# Patient Record
Sex: FEMALE
[De-identification: names, ages, dates, MRNs, and addresses within clinical notes are randomized per-mention and may not be internally consistent; named-entity substitution may affect disease eponyms.]

---

## 2020-05-01 ENCOUNTER — APPOINTMENT (OUTPATIENT)
Dept: CARDIOTHORACIC SURGERY | Facility: CLINIC | Age: 75
End: 2020-05-01

## 2021-08-23 NOTE — CARDIOLOGY SUMMARY
[de-identified] : 4/13/2020 DUAL ISOTOPE MYOCARDIAL PERFUSION SCAN Normal pharmacologic Thallium-Myoview dual isotope scan without evidence of any significant fixed or reversible perfusion abnormality. No evidence of any significant ischemic disease. Correlation with the concurrent stress ECG and the patient's clinical history is suggested for further evaluation.  [de-identified] : 7/5/2013  Normal mitral valve. Trace mitral regurgitation. Normal trileaflet aortic valve. Minimal aortic regurgitation. Normal left ventricular systolic function. No segmental wall motion abnormalities. Mild diastolic dysfunction (Stage I). Inadequate tricuspid regurgitation Doppler envelope precludes estimation of RVSP. Normal tricuspid valve. Trace tricuspid regurgitation. \par

## 2021-08-23 NOTE — HISTORY OF PRESENT ILLNESS
[FreeTextEntry1] : NATHALIE PIERRE is a 76 year old female here as a self-referral (previous patient) on 09/10/2021, 6 years after she was last seen. To review, she has a PMH of HTN, HLD, hypothyroidism, GERD, and anxiety. At her last office visit 6 years ago, her BP was borderline elevated though anxious and no medication changes were made to her regime. She was to continue to monitor her BP at home. \par \par Since she was last here, \par \par She comes to the office today reporting feeling _____ overall.\par She currently denies fever, chills, cough, loss of smell or taste, palpitations, angina, dyspnea, dizziness, lightheadedness, syncope, or peripheral edema. Her energy level is and her  appetite is good. Denies bowel or bladder problems. She does/does not exercise. She has/has not had recent labs. She is fully vaccinated against COVID ( , last dose > 2 weeks ago).\par \par PCP:\par \par \par ***NOTE INCOMPLETE***\par

## 2021-09-10 ENCOUNTER — APPOINTMENT (OUTPATIENT)
Dept: CV DIAGNOSITCS | Facility: HOSPITAL | Age: 76
End: 2021-09-10

## 2021-09-10 ENCOUNTER — APPOINTMENT (OUTPATIENT)
Dept: CARDIOTHORACIC SURGERY | Facility: CLINIC | Age: 76
End: 2021-09-10

## 2021-09-10 ENCOUNTER — NON-APPOINTMENT (OUTPATIENT)
Age: 76
End: 2021-09-10